# Patient Record
Sex: MALE | Race: WHITE | Employment: FULL TIME | URBAN - METROPOLITAN AREA
[De-identification: names, ages, dates, MRNs, and addresses within clinical notes are randomized per-mention and may not be internally consistent; named-entity substitution may affect disease eponyms.]

---

## 2019-12-07 ENCOUNTER — ED HISTORICAL/CONVERTED ENCOUNTER (OUTPATIENT)
Dept: OTHER | Age: 34
End: 2019-12-07

## 2020-01-25 ENCOUNTER — HOSPITAL ENCOUNTER (INPATIENT)
Age: 35
LOS: 5 days | Discharge: HOME | DRG: 750 | End: 2020-01-30
Attending: PSYCHIATRY & NEUROLOGY | Admitting: PSYCHIATRY & NEUROLOGY
Payer: COMMERCIAL

## 2020-01-25 PROBLEM — F25.9 SCHIZOAFFECTIVE DISORDER (HCC): Status: ACTIVE | Noted: 2020-01-25

## 2020-01-25 PROCEDURE — 65220000003 HC RM SEMIPRIVATE PSYCH

## 2020-01-25 PROCEDURE — 74011250637 HC RX REV CODE- 250/637: Performed by: NURSE PRACTITIONER

## 2020-01-25 RX ORDER — OLANZAPINE 5 MG/1
5 TABLET ORAL
Status: DISCONTINUED | OUTPATIENT
Start: 2020-01-25 | End: 2020-01-30 | Stop reason: HOSPADM

## 2020-01-25 RX ORDER — ACETAMINOPHEN 325 MG/1
650 TABLET ORAL
Status: DISCONTINUED | OUTPATIENT
Start: 2020-01-25 | End: 2020-01-30 | Stop reason: HOSPADM

## 2020-01-25 RX ORDER — IBUPROFEN 400 MG/1
400 TABLET ORAL
Status: DISCONTINUED | OUTPATIENT
Start: 2020-01-25 | End: 2020-01-30 | Stop reason: HOSPADM

## 2020-01-25 RX ORDER — HYDROXYZINE 50 MG/1
50 TABLET, FILM COATED ORAL
Status: DISCONTINUED | OUTPATIENT
Start: 2020-01-25 | End: 2020-01-30 | Stop reason: HOSPADM

## 2020-01-25 RX ORDER — IBUPROFEN 200 MG
1 TABLET ORAL DAILY
Status: DISCONTINUED | OUTPATIENT
Start: 2020-01-25 | End: 2020-01-30 | Stop reason: HOSPADM

## 2020-01-25 RX ORDER — CLONIDINE HYDROCHLORIDE 0.1 MG/1
0.1 TABLET ORAL
Status: DISCONTINUED | OUTPATIENT
Start: 2020-01-25 | End: 2020-01-30 | Stop reason: HOSPADM

## 2020-01-25 RX ORDER — CLONIDINE HYDROCHLORIDE 0.1 MG/1
0.1 TABLET ORAL
COMMUNITY
End: 2020-01-30

## 2020-01-25 RX ORDER — QUETIAPINE FUMARATE 100 MG/1
100 TABLET, FILM COATED ORAL
Status: DISCONTINUED | OUTPATIENT
Start: 2020-01-25 | End: 2020-01-29

## 2020-01-25 RX ORDER — QUETIAPINE FUMARATE 300 MG/1
300 TABLET, FILM COATED ORAL
Status: ON HOLD | COMMUNITY
End: 2020-01-30 | Stop reason: SDUPTHER

## 2020-01-25 RX ORDER — ADHESIVE BANDAGE
30 BANDAGE TOPICAL DAILY PRN
Status: DISCONTINUED | OUTPATIENT
Start: 2020-01-25 | End: 2020-01-30 | Stop reason: HOSPADM

## 2020-01-25 RX ORDER — HALOPERIDOL 5 MG/ML
5 INJECTION INTRAMUSCULAR
Status: DISCONTINUED | OUTPATIENT
Start: 2020-01-25 | End: 2020-01-30 | Stop reason: HOSPADM

## 2020-01-25 RX ORDER — TRAZODONE HYDROCHLORIDE 50 MG/1
50 TABLET ORAL
Status: DISCONTINUED | OUTPATIENT
Start: 2020-01-25 | End: 2020-01-30 | Stop reason: HOSPADM

## 2020-01-25 RX ORDER — BENZTROPINE MESYLATE 1 MG/1
1 TABLET ORAL
Status: DISCONTINUED | OUTPATIENT
Start: 2020-01-25 | End: 2020-01-30 | Stop reason: HOSPADM

## 2020-01-25 RX ORDER — DIPHENHYDRAMINE HYDROCHLORIDE 50 MG/ML
50 INJECTION, SOLUTION INTRAMUSCULAR; INTRAVENOUS
Status: DISCONTINUED | OUTPATIENT
Start: 2020-01-25 | End: 2020-01-30 | Stop reason: HOSPADM

## 2020-01-25 RX ORDER — LORAZEPAM 2 MG/ML
1 INJECTION INTRAMUSCULAR
Status: DISCONTINUED | OUTPATIENT
Start: 2020-01-25 | End: 2020-01-30 | Stop reason: HOSPADM

## 2020-01-25 RX ADMIN — HYDROXYZINE HYDROCHLORIDE 50 MG: 50 TABLET, FILM COATED ORAL at 18:19

## 2020-01-25 NOTE — BH NOTES
PRN Medication Documentation    Specific patient behavior that led to need for PRN medication: Pt complaining of anxiety and requesting medication. Staff interventions attempted prior to PRN being given: Therapeutic communication provided. Decreased environmental stimuli. Coping skills discussed. PRN medication given: 1 tablet 50 mg Atarax PO. Patient response/effectiveness of PRN medication: Pt eating dinner.

## 2020-01-25 NOTE — BH NOTES
TRANSFER - IN REPORT:    Verbal report received from  Ryan Roberts on Darwin Holland  being received from [facility] ED for routine progression of care      Report consisted of patients Situation, Background, Assessment and   Recommendations(SBAR). Information from the following report(s) SBAR and Kardex was reviewed with the receiving nurse. Opportunity for questions and clarification was provided. Assessment completed upon patients arrival to unit and care assumed. PT served TDO bharat.  to bring pt. Leaving ED within 10 minuttes.

## 2020-01-25 NOTE — CONSULTS
History & Physical    Primary Care Provider: Unknown, Provider  Source of Information: Patient     History of Presenting Illness:   Marlen Thorne is a 36 y.o. male who presents with depression  78-year-old white male history of depression and degenerative joint disease. Patient is transferred from [facility] to psych unit because of suicidal ideation. Patient said he is taking clonidine 0.1 mg every night for sleep and anxiety. He is aware of that this will drop the blood pressure so he said he is checking his blood pressure every time taking this medicine. Patient has a chronic bilateral knee pain. Denied any fever, no cough, no abdominal pain. Review of Systems:  General: no fever, no changes of appetite or sleep  HEENT: no headache, no vision changes, no nose discharge, no hearing changes   RES: no wheezing, no cough, no sob  CVS: no cp, no palpitation. Muscular: no joint swelling, hpi, no leg swelling  Skin: no rash, no itching   GI: no vomiting, no diarrhea  : no dysuria, no hematuria  Hemo: no gum bleeding, no petechial   Neuro: no sensation changes, no focal weakness   Endo: no polydipsia   Psych: denied depression   No past medical history on file. knee djd. Depression. No past surgical history on file. Prior to Admission medications    Not on File   seroquil. Clonidine, vistral   Allergies   Allergen Reactions    Bactrim [Sulfamethoprim] Unknown (comments)      No family history on file.      SOCIAL HISTORY:  Patient resides:  Independently x   Assisted Living    SNF    With family care       Smoking history:   None    Former    Chronic x     Alcohol history:   None    Social x   Chronic      Ambulates:   Independently x   w/cane    w/walker    w/wc    CODE STATUS:  DNR    Full x   Other      Objective:     Physical Exam:     Visit Vitals  /71 (BP 1 Location: Left arm, BP Patient Position: Sitting)   Pulse 86   Temp 97.6 °F (36.4 °C) Resp 16   SpO2 100%           General:  Alert, cooperative, no distress, appears stated age. Head:  Normocephalic, without obvious abnormality, atraumatic. Eyes:  Conjunctivae/corneas clear. PERRL, EOMs intact. Nose: Nares normal. Septum midline. Mucosa normal. No drainage or sinus tenderness. Throat: Lips, mucosa, and tongue normal. Teeth and gums normal.   Neck: Supple, symmetrical, trachea midline, no adenopathy, thyroid: no enlargement/tenderness/nodules, no carotid bruit and no JVD. Back:   Symmetric, no curvature. ROM normal. No CVA tenderness. Lungs:   Clear to auscultation bilaterally. Chest wall:  No tenderness or deformity. Heart:  Regular rate and rhythm, S1, S2 normal, no murmur, click, rub or gallop. Abdomen:   Soft, non-tender. Bowel sounds normal. No masses,  No organomegaly. Extremities: Extremities normal, atraumatic, no cyanosis or edema. bilat knee ROM normal    Pulses: 2+ and symmetric all extremities. Skin: Skin color, texture, turgor normal. No rashes or lesions   Neurologic: CNII-XII intact. No focal weakness            Data Review:     Recent Days:  No results for input(s): WBC, HGB, HCT, PLT, HGBEXT, HCTEXT, PLTEXT in the last 72 hours. No results for input(s): NA, K, CL, CO2, GLU, BUN, CREA, CA, MG, PHOS, ALB, TBIL, SGOT, ALT, INR, INREXT in the last 72 hours. No results for input(s): PH, PCO2, PO2, HCO3, FIO2 in the last 72 hours. Labs are from [facility] reviewed potassium 3.4, sodium 143, BUN 11, creatinine 0.9, hemoglobin 12.3, WBC 10.7  24 Hour Results:  No results found for this or any previous visit (from the past 24 hour(s)). Imaging:     Assessment:     Active Problems:    * No active hospital problems. *         Plan:     1. bilat knee pain: chronic, mild, may use tylenol as needed  2. Smoking: nicotine patch  3. Depression/SI per psych: pt was on clonidine.  Would like to resume to avoid rebound        Signed By: Ministerio Estrada MD     January 25, 2020
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
## 2020-01-26 PROCEDURE — 74011250637 HC RX REV CODE- 250/637: Performed by: NURSE PRACTITIONER

## 2020-01-26 PROCEDURE — 74011250637 HC RX REV CODE- 250/637: Performed by: HOSPITALIST

## 2020-01-26 PROCEDURE — 74011250637 HC RX REV CODE- 250/637: Performed by: PSYCHIATRY & NEUROLOGY

## 2020-01-26 PROCEDURE — 65220000003 HC RM SEMIPRIVATE PSYCH

## 2020-01-26 RX ORDER — QUETIAPINE FUMARATE 100 MG/1
300 TABLET, FILM COATED ORAL
Status: DISCONTINUED | OUTPATIENT
Start: 2020-01-26 | End: 2020-01-30 | Stop reason: HOSPADM

## 2020-01-26 RX ORDER — QUETIAPINE FUMARATE 100 MG/1
100 TABLET, FILM COATED ORAL DAILY
Status: DISCONTINUED | OUTPATIENT
Start: 2020-01-27 | End: 2020-01-30 | Stop reason: HOSPADM

## 2020-01-26 RX ADMIN — MAGNESIUM HYDROXIDE 30 ML: 400 SUSPENSION ORAL at 21:12

## 2020-01-26 RX ADMIN — CLONIDINE HYDROCHLORIDE 0.1 MG: 0.1 TABLET ORAL at 21:11

## 2020-01-26 RX ADMIN — QUETIAPINE FUMARATE 300 MG: 100 TABLET ORAL at 21:12

## 2020-01-26 NOTE — PROGRESS NOTES
Problem: Altered Thought Process (Adult/Pediatric)  Goal: *STG: Participates in treatment plan  Outcome: Progressing Towards Goal   pt denies SI. Self harming behavior not observed. Problem: Anxiety-Behavioral Health (Adult/Pediatric)  Goal: *STG: Attends activities and groups  Outcome: Progressing Towards Goal     Problem: Anxiety-Behavioral Health (Adult/Pediatric)  Goal: *STG: Attends activities and groups  Outcome: Progressing Towards Goal     Problem: Anxiety-Behavioral Health (Adult/Pediatric)  Goal: Interventions  Outcome: Progressing Towards Goal  Note:   Pt is alert and oriented. Visible on the unit. Blunted affect. Plan to meet with initial treatment team today. Discuss PTA medications, event, and treatment plan. Pt has TDO hearing scheduled of 1/27/20 at 0830 with Michael.     Good appetite. Pt focus is on scheduled Seroquel HS dosage.     [address] [facility] 60  Master Treatment Plan for Jon Coleman    Date Treatment Plan Initiated: 1/27/20    Treatment Plan Modalities:  Type of Modality Amount  (x minutes) Frequency (x/week) Duration (x days) Name of Responsible Staff   [address] [facility] meetings to encourage peer interactions 15 7 1 Cortney COLBERT     Group psychotherapy to assist in building coping skills and internal controls 60 7 1 Jose El   Therapeutic activity groups to build coping skills 60 7 1 Jose El   Psychoeducation in group setting to address:   Medication education   15 7 [address]   Coping skills         Relaxation techniques         Symptom management         Discharge planning   60 2 Perlilliam Lee 115   60 1 1 volunteer   Recovery/AA/NA      volunteer   Physician medication management   15 7 1 Dr Mallory/ Dr Lemus Elias   15 2 1400 [facility]                                Blocked Bed Documentation:    Room number: 732  Type: Behavior  Rationale: Poor Sleep Pattern  Anticipated duration: re evaluate q shift  Additional comments:isomnia, PTSD with impulsive aggressive behaviors increased at night , not on scheduled medications     Pt pharmacy out patient open Mon-Fri. Unable to verify pta medications.

## 2020-01-26 NOTE — BH NOTES
Blocked Bed Documentation:    Room number: 696  Type: Behavior  Rationale: Poor Self-Control  Anticipated duration: reassess every shift  Additional comments: pt presents with paranoia, SI/HI/AVH, insomnia and impulsive
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
## 2020-01-26 NOTE — INTERDISCIPLINARY ROUNDS
Behavioral Health Interdisciplinary Rounds     Patient Name: Anup Barber  Age: 36 y.o. Room/Bed:  732/  Primary Diagnosis: <principal problem not specified>   Admission Status: TDO     Readmission within 30 days: no  Power of  in place: no  Patient requires a blocked bed:yes          Reason for blocked bed: behavior    VTE Prophylaxis: Not indicated    Mobility needs/Fall risk: no  Flu Vaccine : no   Nutritional Plan: no  Consults:          Labs/Testing due today?: no    Sleep hours:  7.5      Participation in Care/Groups:  yes  Medication Compliant?: Yes  PRNS (last 24 hours): None    Restraints (last 24 hours):  no     CIWA (range last 24 hours):     COWS (range last 24 hours):      Alcohol screening (AUDIT) completed -   AUDIT Score: 2     If applicable, date SBIRT discussed in treatment team AND documented:   AUDIT Screen Score: AUDIT Score: 2      Document Brief Intervention (corresponds directly with the 5 A's, Ask, Advise, Assess, Assist, and Arrange): At- Risk Patients (Score 7-15 for women; 8-15 for men)  Discuss concern patient is drinking at unhealthy levels known to increase risk of alcohol-related health problems. Is Patient ready to commit to change? If No:   Encourage reflection   Discuss short term and long term health risks of consuming alcohol   Barriers to change   Reaffirm willingness to help / Educational materials provided  If Yes:   Set goal  [employer] provided    Harmful use or Dependence (Score 16 or greater)   Discuss short term and long term health risks of consuming alcohol   Recommendations   Negotiate drinking goal   Recommend addiction specialist/center   Arrange follow-up appointments.     Tobacco - patient is a smoker: Have You Used Tobacco in the Past 30 Days: Yes  Illegal Drugs use: Have You Used Any Illegal Substances Over the Past 12 Months: Yes    24 hour chart check complete: yes     Patient goal(s) for today:   Treatment team focus/goals:   Progress note     LOS:  0  Expected LOS:     Financial concerns/prescription coverage:    Family contact:       Family requesting physician contact today:    Discharge plan:   Access to weapons : no        Outpatient provider(s):   Patient's preferred phone number for follow up call :     Participating treatment team members: Garrick Pacheco, * (assigned SW),
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
## 2020-01-26 NOTE — PROGRESS NOTES
Pt receiving continuous q15m safety checks, pt currently asleep resting comfortably in bed. No distress noted, respiratory WNL. Supplemental lighting utilized. Problem: Falls - Risk of  Goal: *Absence of Falls  Description  Document Erickson Bray Fall Risk and appropriate interventions in the flowsheet.   Outcome: Progressing Towards Goal  Note: Fall Risk Interventions:       Medication Interventions: Teach patient to arise slowly       Problem: Altered Thought Process (Adult/Pediatric)  Goal: *STG: Remains safe in hospital  Outcome: Progressing Towards Goal

## 2020-01-26 NOTE — GROUP NOTE
[facility] GROUP DOCUMENTATION INDIVIDUAL                                                                          Group Therapy Note    Date: 1/25/2020    Group Start Time: 2030  Group End Time: 2040  Group Topic: Reflection/Relaxation    [address] [facility] 7W ACUTE BEHA HL    [place], [facility]    IP [address] [facility] GROUP DOCUMENTATION GROUP    Group Therapy Note    Attendees:          Attendance: Attended    Patient's Goal:  Unit orientation and daily progress    Interventions/techniques: Supported    Follows Directions: Followed directions    Interactions: Interacted appropriately    Mental Status: Calm and Flat    Behavior/appearance: Cooperative    Goals Achieved: Able to listen to others      Additional Notes:  Quiet in group. Voiced no concerns. Seems to be adjusting to unit okay.     [facility] [id number]

## 2020-01-26 NOTE — GROUP NOTE
IP  GROUP DOCUMENTATION INDIVIDUAL                                                                          Group Therapy Note    Date: 1/26/2020    Group Start Time: 9009  Group End Time: 0439  Group Topic: Community Meeting    [facility] PSYCHIATRIC [place] [facility] [address] [facility]    Chemo [place]    IP  GROUP DOCUMENTATION GROUP    Group Therapy Note    Attendees: 4/10         Attendance: Attended    Patient's Goal:  Get a little better    Interventions/techniques: Informed and Supported    Follows Directions: Followed directions    Interactions: Interacted appropriately    Mental Status: Calm    Behavior/appearance: Cooperative and Enthusiastic    Goals Achieved: Able to engage in interactions, Able to listen to others, Able to give feedback to another and Able to reflect/comment on own behavior      Additional Notes:  Patient talkative with peers displaying a positive attitude and affect.      Rolando Radford

## 2020-01-27 PROCEDURE — 74011250637 HC RX REV CODE- 250/637: Performed by: PSYCHIATRY & NEUROLOGY

## 2020-01-27 PROCEDURE — 65220000003 HC RM SEMIPRIVATE PSYCH

## 2020-01-27 RX ORDER — BUPROPION HYDROCHLORIDE 150 MG/1
150 TABLET ORAL
COMMUNITY

## 2020-01-27 RX ADMIN — QUETIAPINE FUMARATE 300 MG: 100 TABLET ORAL at 20:53

## 2020-01-27 RX ADMIN — QUETIAPINE FUMARATE 100 MG: 100 TABLET ORAL at 08:43

## 2020-01-27 NOTE — INTERDISCIPLINARY ROUNDS
Behavioral Health Interdisciplinary Rounds     Patient Name: Jojo Purdy  Age: 36 y.o. Room/Bed:  732/  Primary Diagnosis: <principal problem not specified>   Admission Status: Voluntary Commitment     Readmission within 30 days: no  Power of  in place: no  Patient requires a blocked bed: yes          Reason for blocked bed:     VTE Prophylaxis: No    Mobility needs/Fall risk: no  Flu Vaccine : no   Nutritional Plan: no  Consults:          Labs/Testing due today?: no    Sleep hours:  8        Participation in Care/Groups:  yes  Medication Compliant?: Yes  PRNS (last 24 hours): None    Restraints (last 24 hours):  no     CIWA (range last 24 hours):     COWS (range last 24 hours):      Alcohol screening (AUDIT) completed -   AUDIT Score: 2     If applicable, date SBIRT discussed in treatment team AND documented:   AUDIT Screen Score: AUDIT Score: 2    Tobacco - patient is a smoker: Have You Used Tobacco in the Past 30 Days: Yes  Illegal Drugs use: Have You Used Any Illegal Substances Over the Past 12 Months: Yes    24 hour chart check complete: yes     Patient goal(s) for today:   Treatment team focus/goals: Plan to assess for medications and discharge needs.    Progress note : he was pleasant and complaint with treatment team.  Plan to safely detox, voluntarily committed to [facility] today      LOS:  2  Expected LOS: TBD    Financial concerns/prescription coverage:  Medicaid   Family contact:       Family requesting physician contact today:    Discharge plan: he will rent to his room for rent   Access to weapons :  no       Outpatient provider(s): [address] [place] [address] for Addiction  Medicine   Patient's preferred phone number for follow up call :     Participating treatment team members: Jojo Purdy,  Iván VAILA 19Nick Avclaire ISABEL

## 2020-01-27 NOTE — BH NOTES
GROUP THERAPY PROGRESS NOTE    Troy Abernathy is participating in [place]. Group time: 30 minutes    Personal goal for participation: to get discharge and go home. Goal orientation: personal    Group therapy participation: active    Therapeutic interventions reviewed and discussed: Writer listened attentively and explained the unit routine. Impression of participation: Patient participated actively.

## 2020-01-27 NOTE — BH NOTES
GROUP THERAPY PROGRESS NOTE    Chantell Cuba did not participate in an Acute Unit Process Group, with a focus on identifying feelings, planning for the rest of the day, and developing coping skills.

## 2020-01-27 NOTE — PROGRESS NOTES
Problem: Patient Education: Go to Patient Education Activity  Goal: Patient/Family Education  Outcome: Resolved/Not Met  Note:   Denies SI/HI CONTRACTS FOR SAFETY ON UNIT.VERBALLY AGREES TO SEEK STAFF IF THOUGHTS OCCUR     Problem: Altered Thought Process (Adult/Pediatric)  Goal: *STG: Participates in treatment plan  Outcome: Progressing Towards Goal     Problem: Altered Thought Process (Adult/Pediatric)  Goal: *STG: Remains safe in hospital  Outcome: Not Progressing Towards Goal  Note:   Self admits some level of paranoia to some of the other patients. Problem: Altered Thought Process (Adult/Pediatric)  Goal: *STG: Complies with medication therapy  Outcome: Progressing Towards Goal  Note:   Has been medication compliant. Verbalized basic understanding of Seroquel. Problem: Anxiety-Behavioral Health (Adult/Pediatric)  Goal: *STG: Attends activities and groups  Outcome: Progressing Towards Goal     Problem: Depressed Mood (Adult/Pediatric)  Goal: *STG: Verbalizes anger, guilt, and other feelings in a constructive manor  Outcome: Progressing Towards Goal  Variance Patient Condition  Impact: Moderate  Note:   Appears to have an increases in patience and less irritable edge at this time.
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
## 2020-01-27 NOTE — PROGRESS NOTES
NUTRITION  Reason for Rescreen: [facility]        RECOMMENDATIONS:   1. Add daily MVI  2. Weekly weights on standing scale    Interventions   - added supplements/snacks: Ensure TID, double portions, snack       Information obtained from:   patient      No past medical history on file. Pt admitted for psychosis. Substance abuse and paranoia PTA. Pt visited today. He reports about 45# wt loss over past 2 months. -180#. Appetite down prior to admit. Eating well since here with extra portions requested. HS snack and Ensure TID added (1050kcal, 60g protein). Predict that since appetite already improved that pt will be meeting nutrition needs. Will sign off at this time. If wt loss this admit, or appetite poor please re-consult. Diet:  regular  Supplements: None  Intake: Good  No data found. Weight Changes:   Loss - per PTA  Wt Readings from Last 10 Encounters:   01/26/20 62.4 kg (137 lb 9.6 oz)     Nutrition-Related Concerns Identified:  None     Nutrition Diagnosis:   1.  Unintended weight loss related to inadequate oral intake 2/2 psychosis, substance abuse as evidenced by severe wt loss PTA; poor PO PTA; >100% meals consumed    Estimated Nutrition Needs:   Kcals/day: 1860 Kcals/day(1860-2170kcal)  Protein: 74 g(1.2g/kg)  Fluid: 2000 ml(1ml/kcal)  Based On: Kcal/kg - specify (Comment)(30-35kcal/kg)  Weight Used: Actual wt(62kg)    PLAN:   - Continue current diet    Rescreen:  []            At Nutrition Risk - will follow          [x]            Signed off    Tabatha Boogie, RD [address] [place] , Pager #[phone] or via [employer]

## 2020-01-27 NOTE — GROUP NOTE
[facility] GROUP DOCUMENTATION INDIVIDUAL Group Therapy Note Date: 1/26/2020 Group Start Time: 2030 Group End Time: 2100 Group Topic: Reflection/Relaxation [address] Shey Estrada [facility] GROUP DOCUMENTATION GROUP Group Therapy Note Attendees: 5/8 Positive Lens/ Reflection Attendance: Attended Patient's Goal: Shared during the group Interventions/techniques: Validated Follows Directions: Followed directions Interactions: Interacted appropriately Mental Status: Calm Behavior/appearance: Motivated Goals Achieved: Able to engage in interactions Additional Notes:  Patient was calm and quiet during the group. Patient volunteered to share his responses during the positive lens exercise. Patient was able to identify ways to cope in a positive manner. Karyn Mcmullen

## 2020-01-27 NOTE — PROGRESS NOTES
2315: Patient in bed resting with eyes closed. Respirations are even and unlabored. No apparent distress. Staff will continue to monitor q15 throughout the shift. Problem: Falls - Risk of  Goal: *Absence of Falls  Description  Document Colby Rogers Fall Risk and appropriate interventions in the flowsheet.   Outcome: Progressing Towards Goal  Note: Fall Risk Interventions:            Medication Interventions: Teach patient to arise slowly                 Blocked Bed Documentation:    Room number: 732  Type: Behavior  Rationale: Poor Self-Control  Anticipated duration: TBD  Additional comments:

## 2020-01-27 NOTE — PROGRESS NOTES
Problem: Altered Thought Process (Adult/Pediatric)  Goal: *STG: Participates in treatment plan  Outcome: Progressing Towards Goal  Goal: *STG: Remains safe in hospital  Outcome: Progressing Towards Goal  Goal: *STG: Seeks staff when feelings of anxiety and fear arise  Outcome: Progressing Towards Goal  Goal: *STG: Complies with medication therapy  Outcome: Progressing Towards Goal  Goal: *LTG: Returns to baseline functioning  Outcome: Progressing Towards Goal  Pt verbalizes reality based thinking. No evidence of psychoses. No acting out behaviors demonstrated.
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
## 2020-01-28 LAB
CHOLEST SERPL-MCNC: 141 MG/DL
EST. AVERAGE GLUCOSE BLD GHB EST-MCNC: 111 MG/DL
HBA1C MFR BLD: 5.5 % (ref 4–5.6)
HDLC SERPL-MCNC: 43 MG/DL
HDLC SERPL: 3.3 {RATIO} (ref 0–5)
LDLC SERPL CALC-MCNC: 58.2 MG/DL (ref 0–100)
LIPID PROFILE,FLP: ABNORMAL
TRIGL SERPL-MCNC: 199 MG/DL (ref ?–150)
VLDLC SERPL CALC-MCNC: 39.8 MG/DL

## 2020-01-28 PROCEDURE — 80061 LIPID PANEL: CPT

## 2020-01-28 PROCEDURE — 83036 HEMOGLOBIN GLYCOSYLATED A1C: CPT

## 2020-01-28 PROCEDURE — 74011250637 HC RX REV CODE- 250/637: Performed by: PSYCHIATRY & NEUROLOGY

## 2020-01-28 PROCEDURE — 65220000003 HC RM SEMIPRIVATE PSYCH

## 2020-01-28 PROCEDURE — 74011250637 HC RX REV CODE- 250/637: Performed by: HOSPITALIST

## 2020-01-28 PROCEDURE — 36415 COLL VENOUS BLD VENIPUNCTURE: CPT

## 2020-01-28 RX ADMIN — CLONIDINE HYDROCHLORIDE 0.1 MG: 0.1 TABLET ORAL at 20:36

## 2020-01-28 RX ADMIN — QUETIAPINE FUMARATE 300 MG: 100 TABLET ORAL at 20:35

## 2020-01-28 RX ADMIN — QUETIAPINE FUMARATE 100 MG: 100 TABLET ORAL at 08:25

## 2020-01-28 NOTE — PROGRESS NOTES
Laboratory Monitoring for Antipsychotics: This patient is currently prescribed the following medication(s):   Current Facility-Administered Medications   Medication Dose Route Frequency    QUEtiapine (SEROquel) tablet 100 mg  100 mg Oral DAILY    QUEtiapine (SEROquel) tablet 300 mg  300 mg Oral QHS    nicotine (NICODERM CQ) 21 mg/24 hr patch 1 Patch  1 Patch TransDERmal DAILY    cloNIDine HCL (CATAPRES) tablet 0.1 mg  0.1 mg Oral QHS     The following labs have been completed for monitoring of antipsychotics and/or mood stabilizers:    Height, Weight, BMI Estimation  Estimated body mass index is 19.74 kg/m² as calculated from the following:    Height as of this encounter: 177.8 cm (70\"). Weight as of this encounter: 62.4 kg (137 lb 9.6 oz). Vital Signs/Blood Pressure  Visit Vitals  /88 (BP 1 Location: Left arm, BP Patient Position: Sitting)   Pulse 100   Temp 98.6 °F (37 °C)   Resp 20   Ht 177.8 cm (70\")   Wt 62.4 kg (137 lb 9.6 oz)   SpO2 100%   BMI 19.74 kg/m²     Renal Function, Hepatic Function and Chemistry  CrCl cannot be calculated (No successful lab value found. ). Lab Results   Component Value Date/Time    Bilirubin, total 0.3 07/17/2010 11:00 PM    Protein, total 8.3 (H) 07/17/2010 11:00 PM    Albumin 4.4 07/17/2010 11:00 PM    Globulin 3.9 07/17/2010 11:00 PM    A-G Ratio 1.1 07/17/2010 11:00 PM    ALT (SGPT) 29 07/17/2010 11:00 PM    Alk.  phosphatase 66 07/17/2010 11:00 PM     No results found for: GLU, GLUCPOC  Lab Results   Component Value Date/Time    Hemoglobin A1c 5.5 01/28/2020 05:55 AM     Hematology  Lab Results   Component Value Date/Time    WBC 9.2 07/17/2010 11:00 PM    RBC 4.74 07/17/2010 11:00 PM    HGB 15.1 07/17/2010 11:00 PM    HCT 42.9 07/17/2010 11:00 PM    MCV 90.5 (H) 07/17/2010 11:00 PM    MCH 31.9 07/17/2010 11:00 PM    MCHC 35.2 07/17/2010 11:00 PM    RDW 12.9 07/17/2010 11:00 PM    PLATELET 703 (L) 70/83/3135 11:00 PM     Lipids  Lab Results   Component Value Date/Time    Cholesterol, total 141 01/28/2020 05:54 AM    HDL Cholesterol 43 01/28/2020 05:54 AM    LDL, calculated 58.2 01/28/2020 05:54 AM    Triglyceride 199 (H) 01/28/2020 05:54 AM    CHOL/HDL Ratio 3.3 01/28/2020 05:54 AM     Thyroid Function  No results found for: TSH, TSH2, TSH3, TSHP, TSHELE, TT3, T3U, T3UP, FRT3, FT3, FT4, FT4P, T4, T4P, FT4T, TT7, TSHEXT    Assessment/Plan:  Recommended baseline laboratory monitoring has been completed based on this patient's current medication regimen. No further interventions are needed at this time. Follow-up metabolic monitoring labs should be completed in 3 months (quarterly for the first year of antipsychotic therapy).      Mary Wheeler, PHARMD

## 2020-01-28 NOTE — PROGRESS NOTES
Problem: Discharge Planning  Goal: *Discharge to safe environment  Outcome: Progressing Towards Goal  Note:   He will return to his room for rent   He has established outpatient treatment   Goal: *Knowledge of medication management  Outcome: Progressing Towards Goal  Note:   He has been compliant with his medications   Goal: *Knowledge of discharge instructions  Outcome: Progressing Towards Goal  Note:   He is able to verbalize discharge instructions

## 2020-01-28 NOTE — BH NOTES
1621 Pt is on 1:1 per Suicide Precautions guidelines.   1721  Suicide Precautions/1:1 discontinued per Provider order.

## 2020-01-28 NOTE — GROUP NOTE
DEVEN  GROUP DOCUMENTATION INDIVIDUAL                                                                          Group Therapy Note    Date: 1/28/2020    Group Start Time: 1000  Group End Time: 1030  Group Topic: [place] Meeting    [address]    [address] [facility] GROUP DOCUMENTATION GROUP    Group Therapy Note    Attendees:          Attendance: Attended    Patient's Goal:      Interventions/techniques: Informed    Follows Directions:  Followed directions    Interactions: Interacted appropriately    Mental Status: Calm    Behavior/appearance: Attentive    Goals Achieved: Able to engage in interactions      Additional Notes:      Brian Mathur

## 2020-01-28 NOTE — INTERDISCIPLINARY ROUNDS
Behavioral Health Interdisciplinary Rounds     Patient Name: Hoda Linton  Age: 36 y.o. Room/Bed:  732/  Primary Diagnosis: <principal problem not specified>   Admission Status: Voluntary Commitment     Readmission within 30 days: no  Power of  in place: no  Patient requires a blocked bed: yes          Reason for blocked bed: behavior    VTE Prophylaxis: No    Mobility needs/Fall risk: no  Flu Vaccine : no   Nutritional Plan: no  Consults:          Labs/Testing due today?: yes    Sleep hours:  9      Participation in Care/Groups:  no  Medication Compliant?: Yes  PRNS (last 24 hours): None    Restraints (last 24 hours):  no     CIWA (range last 24 hours):     COWS (range last 24 hours):      Alcohol screening (AUDIT) completed -   AUDIT Score: 2     If applicable, date SBIRT discussed in treatment team AND documented:   AUDIT Screen Score: AUDIT Score: 2      Tobacco - patient is a smoker: Have You Used Tobacco in the Past 30 Days: Yes  Illegal Drugs use: Have You Used Any Illegal Substances Over the Past 12 Months: Yes    24 hour chart check complete: yes     Patient goal(s) for today:   Treatment team focus/goals: Plan to continue to monitor his medications and set up for discharge on Thursday. Progress note : less paranoid and denies hearing voices. LOS:  2  Expected LOS: TBD    Financial concerns/prescription coverage:  no  Family contact:       Family requesting physician contact today:  no  Discharge plan: he will return to his room for rent. Access to weapons : no        Outpatient provider(s): [facility]  Patient's preferred phone number for follow up call :     Participating treatment team members: Hoda Linton,  Laura Reeves, Juan Antonio Castellano NP - Marilyn Ruano RN - Hannah Moseley, PharmD.

## 2020-01-28 NOTE — PROGRESS NOTES
Problem: Anxiety-Behavioral Health (Adult/Pediatric)  Goal: *STG: Remains safe in hospital  Outcome: Progressing Towards Goal  Note:   Out in milieu for short period of time during dinner time. Minimal interaction with peers. No acute distress noted. Meal and medication compliant. Staff will continue to monitor q 15 min checks.
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
## 2020-01-28 NOTE — PROGRESS NOTES
Pt receiving continuous q15m safety checks, pt currently asleep resting comfortably in bed. No distress noted, respiratory WNL. Supplemental lighting utilized. Blocked Bed Documentation:    Room number: 085  Type: Behavior  Rationale: Poor Self-Control  Anticipated duration: reassess every shift  Additional comments:    Problem: Falls - Risk of  Goal: *Absence of Falls  Description  Document Crystal Norman Fall Risk and appropriate interventions in the flowsheet. Outcome: Progressing Towards Goal  Note: Fall Risk Interventions:       Mentation Interventions: Adequate sleep, hydration, pain control    Medication Interventions: Teach patient to arise slowly    Elimination Interventions:  Toilet paper/wipes in reach    History of Falls Interventions: Room close to nurse's station         Problem: Altered Thought Process (Adult/Pediatric)  Goal: *STG: Remains safe in hospital  Outcome: Progressing Towards Goal     Problem: Anxiety-Behavioral Health (Adult/Pediatric)  Goal: *STG: Remains safe in hospital  Outcome: Progressing Towards Goal

## 2020-01-28 NOTE — DISCHARGE INSTRUCTIONS
DISCHARGE SUMMARY    Deisy Tate  : 1985  MRN: [id number]    The patient Elysia Lynn exhibits the ability to control behavior in a less restrictive environment. Patient's level of functioning is improving. No assaultive/destructive behavior has been observed for the past 24 hours. No suicidal/homicidal threat or behavior has been observed for the past 24 hours. There is no evidence of serious medication side effects. Patient has not been in physical or protective restraints for at least the past 24 hours. If weapons involved, how are they secured? No weapons involved     Is patient aware of and in agreement with discharge plan? He is aware of discharge and is in agreement     Arrangements for medication:  Prescriptions given to patient. Copy of discharge instructions to provider?:  Yes, fax to [address] for transportation home: patient to arrange     Keep all follow up appointments as scheduled, continue to take prescribed medications per physician instructions. Mental health crisis number:  [phone] or your local mental health crisis line number at [facility] [phone] from Nurse    PATIENT INSTRUCTIONS:    What to do at Home:  Recommended activity: Activity as tolerated,     If you experience any of the following symptoms hoplessness,thoughts of hurting yourself or othesrs please follow up with 911 or [phone] your local mental health crisis number . *  Please give a list of your current medications to your Primary Care Provider. *  Please update this list whenever your medications are discontinued, doses are      changed, or new medications (including over-the-counter products) are added. *  Please carry medication information at all times in case of emergency situations.     These are general instructions for a healthy lifestyle:    No smoking/ No tobacco products/ Avoid exposure to second hand smoke  Surgeon General's Warning:  Quitting smoking now greatly reduces serious risk to your health. Obesity, smoking, and sedentary lifestyle greatly increases your risk for illness    A healthy diet, regular physical exercise & weight monitoring are important for maintaining a healthy lifestyle    You may be retaining fluid if you have a history of heart failure or if you experience any of the following symptoms:  Weight gain of 3 pounds or more overnight or 5 pounds in a week, increased swelling in our hands or feet or shortness of breath while lying flat in bed. Please call your doctor as soon as you notice any of these symptoms; do not wait until your next office visit. The discharge information has been reviewed with the patient. The patient verbalized understanding. Discharge medications reviewed with the patient and appropriate educational materials and side effects teaching were provided.   ___________________________________________________________________________________________________________________________________

## 2020-01-29 PROCEDURE — 74011250637 HC RX REV CODE- 250/637: Performed by: NURSE PRACTITIONER

## 2020-01-29 PROCEDURE — 65220000003 HC RM SEMIPRIVATE PSYCH

## 2020-01-29 PROCEDURE — 74011250637 HC RX REV CODE- 250/637: Performed by: PSYCHIATRY & NEUROLOGY

## 2020-01-29 RX ADMIN — HYDROXYZINE HYDROCHLORIDE 50 MG: 50 TABLET, FILM COATED ORAL at 01:32

## 2020-01-29 RX ADMIN — QUETIAPINE FUMARATE 300 MG: 100 TABLET ORAL at 21:12

## 2020-01-29 RX ADMIN — QUETIAPINE FUMARATE 100 MG: 100 TABLET ORAL at 08:13

## 2020-01-29 RX ADMIN — HYDROXYZINE HYDROCHLORIDE 50 MG: 50 TABLET, FILM COATED ORAL at 20:11

## 2020-01-29 NOTE — BH NOTES
PRN Medication Documentation    Specific patient behavior that led to need for PRN medication: C/o of nausea , Restless   Staff interventions attempted prior to PRN being given: saltines , room temp ginger ale   PRN medication given: Atarax 50 mg po   Patient response/effectiveness of PRN medication: pending

## 2020-01-29 NOTE — PROGRESS NOTES
Pt received resting quietly in bed with eyes closed, appears asleep. Respirations even and unlabored, NAD noted. Staff to continue q15 minute checks for safety. Problem: Falls - Risk of  Goal: *Absence of Falls  Description  Document July Getting Fall Risk and appropriate interventions in the flowsheet. Outcome: Progressing Towards Goal  Note: Fall Risk Interventions:  Mobility Interventions: Assess mobility with egress test    Mentation Interventions: Adequate sleep, hydration, pain control    Medication Interventions: Teach patient to arise slowly    Elimination Interventions:  Toilet paper/wipes in reach    History of Falls Interventions: Door open when patient unattended         Problem: Altered Thought Process (Adult/Pediatric)  Goal: *STG: Remains safe in hospital  Outcome: Progressing Towards Goal

## 2020-01-29 NOTE — PROGRESS NOTES
Problem: Patient Education: Go to Patient Education Activity  Goal: Patient/Family Education  Outcome: Progressing Towards Goal     Problem: Falls - Risk of  Goal: *Absence of Falls  Description  Document Erickson Asa Fall Risk and appropriate interventions in the flowsheet. Outcome: Progressing Towards Goal  Note: Fall Risk Interventions:  Mobility Interventions: Assess mobility with egress test    Mentation Interventions: Adequate sleep, hydration, pain control    Medication Interventions: Teach patient to arise slowly    Elimination Interventions: Toilet paper/wipes in reach    History of Falls Interventions: Door open when patient unattended         Problem: Patient Education: Go to Patient Education Activity  Goal: Patient/Family Education  Outcome: Progressing Towards Goal     Problem: Altered Thought Process (Adult/Pediatric)  Goal: *STG: Participates in treatment plan  Outcome: Progressing Towards Goal  Note:   Pt is cooperative on unit, social with others. Denies SI/HI. Displaying minor anxiety.       Problem: Altered Thought Process (Adult/Pediatric)  Goal: *STG: Complies with medication therapy  Note:   Medication compliant

## 2020-01-29 NOTE — BH NOTES
GROUP THERAPY PROGRESS NOTE    Leopold Brim did not participate in an Acute Unit Process Group, with a focus on identifying feelings, planning for the rest of the day, and developing coping skills.
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
## 2020-01-29 NOTE — BH NOTES
States bedtime Seroquel makes him nauseated , asked for saltines and ginger ale   Then asked for peanut butter stating it makes him hungry

## 2020-01-29 NOTE — GROUP NOTE
DEVEN  GROUP DOCUMENTATION INDIVIDUAL Group Therapy Note Date: 1/29/2020 Group Start Time: 1000 Group End Time: 3064 Group Topic: ComRaj Vega [facility] Group Therapy Note Attendees:  
 
  
 
Attendance: Attended Patient's Goal: Interventions/techniques: Informed Follows Directions: Followed directions Interactions: Interacted appropriately Mental Status: Calm Behavior/appearance: Attentive and Cooperative Goals Achieved: Able to engage in interactions Additional Notes:   
 
Charleen Guidry

## 2020-01-30 VITALS
WEIGHT: 137.6 LBS | DIASTOLIC BLOOD PRESSURE: 74 MMHG | SYSTOLIC BLOOD PRESSURE: 104 MMHG | TEMPERATURE: 98 F | HEIGHT: 70 IN | RESPIRATION RATE: 16 BRPM | OXYGEN SATURATION: 100 % | HEART RATE: 96 BPM | BODY MASS INDEX: 19.7 KG/M2

## 2020-01-30 PROCEDURE — 74011250637 HC RX REV CODE- 250/637: Performed by: PSYCHIATRY & NEUROLOGY

## 2020-01-30 PROCEDURE — 74011250637 HC RX REV CODE- 250/637: Performed by: NURSE PRACTITIONER

## 2020-01-30 RX ORDER — QUETIAPINE FUMARATE 300 MG/1
300 TABLET, FILM COATED ORAL
Qty: 30 TAB | Refills: 0 | Status: SHIPPED | OUTPATIENT
Start: 2020-01-30

## 2020-01-30 RX ORDER — QUETIAPINE FUMARATE 100 MG/1
100 TABLET, FILM COATED ORAL DAILY
Qty: 30 TAB | Refills: 0 | Status: SHIPPED | OUTPATIENT
Start: 2020-01-30

## 2020-01-30 RX ADMIN — QUETIAPINE FUMARATE 100 MG: 100 TABLET ORAL at 07:51

## 2020-01-30 NOTE — BH NOTES
GROUP THERAPY PROGRESS NOTE    Carla Joel did not participate in an Acute Unit Process Group, with a focus on identifying feelings, planning for the rest of the day, and developing coping skills. He may have been finalizing his discharge plans with members of his treatment team, especially nursing.

## 2020-01-30 NOTE — PROGRESS NOTES
Problem: Altered Thought Process (Adult/Pediatric)  Goal: *STG: Remains safe in hospital  Outcome: Progressing Towards Goal  Goal: *STG: Complies with medication therapy  Outcome: Progressing Towards Goal     Problem: Altered Thought Process (Adult/Pediatric)  Goal: *STG: Seeks staff when feelings of anxiety and fear arise  Outcome: Not Progressing Towards Goal  Pt is alert and oriented. Stayed in the room for most of the evening. Cooperative and responds appropriately on approach. Medication compliant. Will continue to monitor Q 15 minutes for the safety.

## 2020-01-30 NOTE — PROGRESS NOTES
Pharmacist Discharge Medication Reconciliation    Discharging Provider: Salvador Campa NP    Significant PMH: History reviewed. No pertinent past medical history. Chief Complaint for this Admission: No chief complaint on file. Allergies: Bactrim [sulfamethoprim]    Discharge Medications:   Current Discharge Medication List        CONTINUE these medications which have CHANGED    Details   !! QUEtiapine (SEROQUEL) 100 mg tablet Take 1 Tab by mouth daily. Indications: bipolar depression  Qty: 30 Tab, Refills: 0      !! QUEtiapine (SEROQUEL) 300 mg tablet Take 1 Tab by mouth nightly. Indications: bipolar depression  Qty: 30 Tab, Refills: 0       !! - Potential duplicate medications found. Please discuss with provider. CONTINUE these medications which have NOT CHANGED    Details   buPROPion XL (WELLBUTRIN XL) 150 mg tablet Take 150 mg by mouth every morning.            STOP taking these medications       cloNIDine HCL (CATAPRES) 0.1 mg tablet Comments:   Reason for Stopping:             The patient's chart, MAR and AVS were reviewed by Randy Patterson, PHARMD.

## 2020-01-30 NOTE — PROGRESS NOTES
Problem: Falls - Risk of  Goal: *Absence of Falls  Description  Document Marco Antonio Madrigal Fall Risk and appropriate interventions in the flowsheet. Outcome: Progressing Towards Goal  Note: Fall Risk Interventions:  Mobility Interventions: Assess mobility with egress test    Mentation Interventions: Adequate sleep, hydration, pain control    Medication Interventions: Teach patient to arise slowly    Elimination Interventions: Toilet paper/wipes in reach    History of Falls Interventions: Door open when patient unattended    Resting in bed with eyes closed, no complaints, no distress noted. Safety measures in place, will continue to monitor.
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
## 2020-01-30 NOTE — BH NOTES
GROUP THERAPY PROGRESS NOTE    Fareed Marroquin is participating in [place].      Group time: 15 minutes    Personal goal for participation: discharge today and clean apartment when he get home    Goal orientation: community    Group therapy participation: active    Therapeutic interventions reviewed and discussed: yes    Impression of participation: engaged

## 2020-01-30 NOTE — INTERDISCIPLINARY ROUNDS
Behavioral Health Interdisciplinary Rounds     Patient Name: Mellie Dakins  Age: 36 y.o. Room/Bed:  730/  Primary Diagnosis: <principal problem not specified>   Admission Status: Voluntary Commitment     Readmission within 30 days: no  Power of  in place: no  Patient requires a blocked bed: no          Reason for blocked bed:     VTE Prophylaxis: No    Mobility needs/Fall risk: no  Flu Vaccine : no   Nutritional Plan: no  Consults:          Labs/Testing due today?: no    Sleep hours:  8      Participation in Care/Groups:  yes  Medication Compliant?: Yes  PRNS (last 24 hours): Antianxiety    Restraints (last 24 hours):  no     CIWA (range last 24 hours):     COWS (range last 24 hours):      Alcohol screening (AUDIT) completed -   AUDIT Score: 2     If applicable, date SBIRT discussed in treatment team AND documented:   AUDIT Screen Score: AUDIT Score: 2      Document Brief Intervention (corresponds directly with the 5 A's, Ask, Advise, Assess, Assist, and Arrange): At- Risk Patients (Score 7-15 for women; 8-15 for men)  Discuss concern patient is drinking at unhealthy levels known to increase risk of alcohol-related health problems. Is Patient ready to commit to change? If No:   Encourage reflection   Discuss short term and long term health risks of consuming alcohol   Barriers to change   Reaffirm willingness to help / Educational materials provided  If Yes:   Set goal  Mark media provided    Harmful use or Dependence (Score 16 or greater)   Discuss short term and long term health risks of consuming alcohol   Recommendations   Negotiate drinking goal   Recommend addiction specialist/center   Arrange follow-up appointments.     Tobacco - patient is a smoker: Have You Used Tobacco in the Past 30 Days: Yes  Illegal Drugs use: Have You Used Any Illegal Substances Over the Past 12 Months: Yes    24 hour chart check complete: yes     Patient goal(s) for today: Treatment team focus/goals:   Progress note     LOS:  5  Expected LOS:     Financial concerns/prescription coverage:    Family contact:     Family requesting physician contact today:   Discharge plan:   Access to weapons :       Outpatient provider(s):   Patient's preferred phone number for follow up call :     Participating treatment team members: Lizz Lozano, * (assigned SW),

## 2020-01-30 NOTE — PROGRESS NOTES
DISCHARGE SUMMARY from Nurse    PATIENT INSTRUCTIONS:      What to do at Home:  Recommended activity: Activity as tolerated,     *  Please give a list of your current medications to your Primary Care Provider. *  Please update this list whenever your medications are discontinued, doses are      changed, or new medications (including over-the-counter products) are added. *  Please carry medication information at all times in case of emergency situations. These are general instructions for a healthy lifestyle:    No smoking/ No tobacco products/ Avoid exposure to second hand smoke  Surgeon General's Warning:  Quitting smoking now greatly reduces serious risk to your health. Obesity, smoking, and sedentary lifestyle greatly increases your risk for illness    A healthy diet, regular physical exercise & weight monitoring are important for maintaining a healthy lifestyle    You may be retaining fluid if you have a history of heart failure or if you experience any of the following symptoms:  Weight gain of 3 pounds or more overnight or 5 pounds in a week, increased swelling in our hands or feet or shortness of breath while lying flat in bed. Please call your doctor as soon as you notice any of these symptoms; do not wait until your next office visit. The discharge information has been reviewed with the patient. The patient verbalized understanding. Discharge medications reviewed with the patient and appropriate educational materials and side effects teaching were provided. Patient received all valuables and belongings plus two prescriptions. .Awaiting arrival of family for transport home.  ___________________________________________________________________________________________________________________________________  Problem: Altered Thought Process (Adult/Pediatric)  Goal: *STG: Remains safe in hospital  Outcome: Progressing Towards Goal  Note:   Pt denies any suicidal or homicidal thoughts. Contracts for safety. Remains on Q 15 min safety checks. No agitation or aggression displayed       Problem: Altered Thought Process (Adult/Pediatric)  Goal: *LTG: Returns to baseline functioning  Outcome: Progressing Towards Goal  Note:   Self reports 'I feel like I  have things more together. I feel calmer. \"     Problem: Anxiety-Behavioral Health (Adult/Pediatric)  Goal: *STG: Attends activities and groups  Outcome: Not Progressing Towards Goal  Note:   Tends to decline group participation with no explanation offered.
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
## 2020-05-07 ENCOUNTER — ED HISTORICAL/CONVERTED ENCOUNTER (OUTPATIENT)
Dept: OTHER | Age: 35
End: 2020-05-07

## 2020-06-05 ENCOUNTER — ED HISTORICAL/CONVERTED ENCOUNTER (OUTPATIENT)
Dept: OTHER | Age: 35
End: 2020-06-05

## 2020-08-16 ENCOUNTER — IP HISTORICAL/CONVERTED ENCOUNTER (OUTPATIENT)
Dept: OTHER | Age: 35
End: 2020-08-16

## 2020-09-02 ENCOUNTER — ED HISTORICAL/CONVERTED ENCOUNTER (OUTPATIENT)
Dept: OTHER | Age: 35
End: 2020-09-02

## 2021-05-25 ENCOUNTER — TRANSCRIBE ORDER (OUTPATIENT)
Dept: SCHEDULING | Age: 36
End: 2021-05-25

## 2021-05-25 DIAGNOSIS — R93.89 ABNORMAL RADIOLOGICAL FINDINGS IN SKIN AND SUBCUTANEOUS TISSUE: Primary | ICD-10-CM

## 2021-06-07 ENCOUNTER — HOSPITAL ENCOUNTER (OUTPATIENT)
Dept: MRI IMAGING | Age: 36
Discharge: HOME | End: 2021-06-07
Attending: NURSE PRACTITIONER
Payer: MEDICAID

## 2021-06-07 VITALS — WEIGHT: 145 LBS | BODY MASS INDEX: 20.81 KG/M2

## 2021-06-07 DIAGNOSIS — R93.89 ABNORMAL RADIOLOGICAL FINDINGS IN SKIN AND SUBCUTANEOUS TISSUE: ICD-10-CM

## 2021-06-07 PROCEDURE — A9585 GADOBUTROL INJECTION: HCPCS | Performed by: RADIOLOGY

## 2021-06-07 PROCEDURE — 74183 MRI ABD W/O CNTR FLWD CNTR: CPT

## 2021-06-07 PROCEDURE — 74011250636 HC RX REV CODE- 250/636: Performed by: RADIOLOGY

## 2021-06-07 PROCEDURE — 77030021566

## 2021-06-07 RX ADMIN — GADOBUTROL 6 ML: 604.72 INJECTION INTRAVENOUS at 09:46

## 2022-03-20 PROBLEM — F25.9 SCHIZOAFFECTIVE DISORDER (HCC): Status: ACTIVE | Noted: 2020-01-25

## 2023-05-14 RX ORDER — BUPROPION HYDROCHLORIDE 150 MG/1
150 TABLET ORAL
COMMUNITY

## 2023-05-14 RX ORDER — QUETIAPINE FUMARATE 100 MG/1
100 TABLET, FILM COATED ORAL DAILY
COMMUNITY
Start: 2020-01-30

## 2023-05-14 RX ORDER — QUETIAPINE FUMARATE 300 MG/1
300 TABLET, FILM COATED ORAL
COMMUNITY
Start: 2020-01-30

## 2025-01-31 ENCOUNTER — HOSPITAL ENCOUNTER (EMERGENCY)
Facility: HOSPITAL | Age: 40
Discharge: HOME | End: 2025-01-31
Attending: EMERGENCY MEDICINE
Payer: OTHER GOVERNMENT

## 2025-01-31 VITALS
TEMPERATURE: 99.9 F | OXYGEN SATURATION: 100 % | BODY MASS INDEX: 22.19 KG/M2 | WEIGHT: 155 LBS | DIASTOLIC BLOOD PRESSURE: 66 MMHG | HEART RATE: 88 BPM | SYSTOLIC BLOOD PRESSURE: 135 MMHG | HEIGHT: 70 IN | RESPIRATION RATE: 18 BRPM

## 2025-01-31 DIAGNOSIS — J10.1 INFLUENZA A: Primary | ICD-10-CM

## 2025-01-31 DIAGNOSIS — E86.0 DEHYDRATION: ICD-10-CM

## 2025-01-31 DIAGNOSIS — R11.2 NAUSEA AND VOMITING, UNSPECIFIED VOMITING TYPE: ICD-10-CM

## 2025-01-31 LAB
ALBUMIN SERPL-MCNC: 4.5 G/DL (ref 3.5–5)
ALBUMIN/GLOB SERPL: 1.1 (ref 1.1–2.2)
ALP SERPL-CCNC: 65 U/L (ref 45–117)
ALT SERPL-CCNC: 49 U/L (ref 12–78)
ANION GAP SERPL CALC-SCNC: 14 MMOL/L (ref 2–12)
AST SERPL W P-5'-P-CCNC: 21 U/L (ref 15–37)
BASOPHILS # BLD: 0.02 K/UL (ref 0–0.1)
BASOPHILS NFR BLD: 0.2 % (ref 0–1)
BILIRUB DIRECT SERPL-MCNC: 0.1 MG/DL (ref 0–0.2)
BILIRUB SERPL-MCNC: 0.5 MG/DL (ref 0.2–1)
BUN SERPL-MCNC: 16 MG/DL (ref 6–20)
BUN/CREAT SERPL: 13 (ref 12–20)
CA-I BLD-MCNC: 9.8 MG/DL (ref 8.5–10.1)
CHLORIDE SERPL-SCNC: 99 MMOL/L (ref 97–108)
CO2 SERPL-SCNC: 25 MMOL/L (ref 21–32)
CREAT SERPL-MCNC: 1.24 MG/DL (ref 0.7–1.3)
DIFFERENTIAL METHOD BLD: ABNORMAL
EOSINOPHIL # BLD: 0.04 K/UL (ref 0–0.4)
EOSINOPHIL NFR BLD: 0.3 % (ref 0–7)
ERYTHROCYTE [DISTWIDTH] IN BLOOD BY AUTOMATED COUNT: 13.2 % (ref 11.5–14.5)
FLUAV RNA SPEC QL NAA+PROBE: DETECTED
FLUBV RNA SPEC QL NAA+PROBE: NOT DETECTED
GLOBULIN SER CALC-MCNC: 4.1 G/DL (ref 2–4)
GLUCOSE SERPL-MCNC: 115 MG/DL (ref 65–100)
HCT VFR BLD AUTO: 45.3 % (ref 36.6–50.3)
HGB BLD-MCNC: 14.6 G/DL (ref 12.1–17)
IMM GRANULOCYTES # BLD AUTO: 0.02 K/UL (ref 0–0.04)
IMM GRANULOCYTES NFR BLD AUTO: 0.2 % (ref 0–0.5)
LYMPHOCYTES # BLD: 1 K/UL (ref 0.8–3.5)
LYMPHOCYTES NFR BLD: 8.2 % (ref 12–49)
MCH RBC QN AUTO: 28.2 PG (ref 26–34)
MCHC RBC AUTO-ENTMCNC: 32.2 G/DL (ref 30–36.5)
MCV RBC AUTO: 87.5 FL (ref 80–99)
MONOCYTES # BLD: 0.64 K/UL (ref 0–1)
MONOCYTES NFR BLD: 5.3 % (ref 5–13)
NEUTS SEG # BLD: 10.45 K/UL (ref 1.8–8)
NEUTS SEG NFR BLD: 85.8 % (ref 32–75)
PLATELET # BLD AUTO: 219 K/UL (ref 150–400)
PMV BLD AUTO: 9.8 FL (ref 8.9–12.9)
POTASSIUM SERPL-SCNC: 4 MMOL/L (ref 3.5–5.1)
PROT SERPL-MCNC: 8.6 G/DL (ref 6.4–8.2)
RBC # BLD AUTO: 5.18 M/UL (ref 4.1–5.7)
SARS-COV-2 RNA RESP QL NAA+PROBE: NOT DETECTED
SODIUM SERPL-SCNC: 138 MMOL/L (ref 136–145)
WBC # BLD AUTO: 12.2 K/UL (ref 4.1–11.1)

## 2025-01-31 PROCEDURE — 96374 THER/PROPH/DIAG INJ IV PUSH: CPT

## 2025-01-31 PROCEDURE — 96375 TX/PRO/DX INJ NEW DRUG ADDON: CPT

## 2025-01-31 PROCEDURE — 6370000000 HC RX 637 (ALT 250 FOR IP): Performed by: EMERGENCY MEDICINE

## 2025-01-31 PROCEDURE — 80048 BASIC METABOLIC PNL TOTAL CA: CPT

## 2025-01-31 PROCEDURE — 99284 EMERGENCY DEPT VISIT MOD MDM: CPT

## 2025-01-31 PROCEDURE — 80076 HEPATIC FUNCTION PANEL: CPT

## 2025-01-31 PROCEDURE — 2580000003 HC RX 258: Performed by: EMERGENCY MEDICINE

## 2025-01-31 PROCEDURE — 36415 COLL VENOUS BLD VENIPUNCTURE: CPT

## 2025-01-31 PROCEDURE — 96361 HYDRATE IV INFUSION ADD-ON: CPT

## 2025-01-31 PROCEDURE — 87636 SARSCOV2 & INF A&B AMP PRB: CPT

## 2025-01-31 PROCEDURE — 6360000002 HC RX W HCPCS: Performed by: EMERGENCY MEDICINE

## 2025-01-31 PROCEDURE — 85025 COMPLETE CBC W/AUTO DIFF WBC: CPT

## 2025-01-31 RX ORDER — KETOROLAC TROMETHAMINE 15 MG/ML
15 INJECTION, SOLUTION INTRAMUSCULAR; INTRAVENOUS ONCE
Status: COMPLETED | OUTPATIENT
Start: 2025-01-31 | End: 2025-01-31

## 2025-01-31 RX ORDER — 0.9 % SODIUM CHLORIDE 0.9 %
1000 INTRAVENOUS SOLUTION INTRAVENOUS
Status: COMPLETED | OUTPATIENT
Start: 2025-01-31 | End: 2025-01-31

## 2025-01-31 RX ORDER — ONDANSETRON 4 MG/1
4 TABLET, ORALLY DISINTEGRATING ORAL 3 TIMES DAILY PRN
Qty: 21 TABLET | Refills: 0 | Status: SHIPPED | OUTPATIENT
Start: 2025-01-31

## 2025-01-31 RX ORDER — ACETAMINOPHEN 325 MG/1
650 TABLET ORAL
Status: COMPLETED | OUTPATIENT
Start: 2025-01-31 | End: 2025-01-31

## 2025-01-31 RX ORDER — ONDANSETRON 2 MG/ML
8 INJECTION INTRAMUSCULAR; INTRAVENOUS ONCE
Status: COMPLETED | OUTPATIENT
Start: 2025-01-31 | End: 2025-01-31

## 2025-01-31 RX ADMIN — ONDANSETRON 8 MG: 2 INJECTION INTRAMUSCULAR; INTRAVENOUS at 09:22

## 2025-01-31 RX ADMIN — ACETAMINOPHEN 650 MG: 325 TABLET ORAL at 09:22

## 2025-01-31 RX ADMIN — KETOROLAC TROMETHAMINE 15 MG: 15 INJECTION, SOLUTION INTRAMUSCULAR; INTRAVENOUS at 09:23

## 2025-01-31 RX ADMIN — SODIUM CHLORIDE 1000 ML: 9 INJECTION, SOLUTION INTRAVENOUS at 09:29

## 2025-01-31 ASSESSMENT — PAIN - FUNCTIONAL ASSESSMENT: PAIN_FUNCTIONAL_ASSESSMENT: 0-10

## 2025-01-31 ASSESSMENT — PAIN DESCRIPTION - LOCATION: LOCATION: HEAD

## 2025-01-31 ASSESSMENT — PAIN DESCRIPTION - DESCRIPTORS: DESCRIPTORS: POUNDING

## 2025-01-31 ASSESSMENT — PAIN SCALES - GENERAL: PAINLEVEL_OUTOF10: 8

## 2025-01-31 NOTE — ED PROVIDER NOTES
[facility] EMERGENCY DEPT  EMERGENCY DEPARTMENT HISTORY AND PHYSICAL EXAM      Date: 1/31/2025  Patient Name: Lew March Jr.  MRN: [id number]  Birthdate 1985  Date of evaluation: 1/31/2025  Provider: Frank Caicedo MD   Note Started: 8:59 AM EST 1/31/25    HISTORY OF PRESENT ILLNESS     Chief Complaint   Patient presents with    Back Pain    Headache    Nausea       History Provided By: Patient    HPI: Lew March Jr. is a 39 y.o. male presents with nausea bodyaches fatigue headache 1 day    PAST MEDICAL HISTORY   Past Medical History:  History reviewed. No pertinent past medical history.    Past Surgical History:  History reviewed. No pertinent surgical history.    Family History:  History reviewed. No pertinent family history.    Social History:  Social History     Tobacco Use    Smoking status: Every Day     Current packs/day: 1.00     Types: Cigarettes    Smokeless tobacco: Never   Substance Use Topics    Alcohol use: Not Currently    Drug use: Never       Allergies:  Allergies   Allergen Reactions    Sulfamethoxazole-Trimethoprim      Other reaction(s): Unknown (comments)       PCP: Jesse Arauz MD    Current Meds:   Current Facility-Administered Medications   Medication Dose Route Frequency Provider Last Rate Last Admin    sodium chloride 0.9 % bolus 1,000 mL  1,000 mL IntraVENous NOW Frank Caicedo .6 mL/hr at 01/31/25 0929 1,000 mL at 01/31/25 0929     Current Outpatient Medications   Medication Sig Dispense Refill    buPROPion (WELLBUTRIN XL) 150 MG extended release tablet Take 1 tablet by mouth      QUEtiapine (SEROQUEL) 100 MG tablet Take 1 tablet by mouth daily      QUEtiapine (SEROQUEL) 300 MG tablet Take 1 tablet by mouth         Social Determinants of Health:   Social Determinants of Health     Tobacco Use: High Risk (1/31/2025)    Patient History     Smoking Tobacco Use: Every Day     Smokeless Tobacco Use: Never     Passive Exposure: Not on file   Alcohol Use: Not on

## 2025-06-29 ENCOUNTER — HOSPITAL ENCOUNTER (EMERGENCY)
Facility: HOSPITAL | Age: 40
Discharge: HOME | End: 2025-06-29
Attending: EMERGENCY MEDICINE
Payer: OTHER GOVERNMENT

## 2025-06-29 ENCOUNTER — APPOINTMENT (OUTPATIENT)
Facility: HOSPITAL | Age: 40
End: 2025-06-29
Payer: OTHER GOVERNMENT

## 2025-06-29 VITALS
WEIGHT: 155 LBS | OXYGEN SATURATION: 98 % | RESPIRATION RATE: 18 BRPM | BODY MASS INDEX: 22.19 KG/M2 | HEART RATE: 91 BPM | SYSTOLIC BLOOD PRESSURE: 142 MMHG | DIASTOLIC BLOOD PRESSURE: 81 MMHG | TEMPERATURE: 98.5 F | HEIGHT: 70 IN

## 2025-06-29 DIAGNOSIS — R07.9 CHEST PAIN, UNSPECIFIED TYPE: Primary | ICD-10-CM

## 2025-06-29 DIAGNOSIS — R11.2 NAUSEA AND VOMITING, UNSPECIFIED VOMITING TYPE: ICD-10-CM

## 2025-06-29 DIAGNOSIS — F25.9 SCHIZOAFFECTIVE DISORDER, UNSPECIFIED TYPE (HCC): ICD-10-CM

## 2025-06-29 DIAGNOSIS — F19.10 POLYSUBSTANCE ABUSE (HCC): ICD-10-CM

## 2025-06-29 LAB
ALBUMIN SERPL-MCNC: 3.7 G/DL (ref 3.5–5)
ALBUMIN/GLOB SERPL: 1 (ref 1.1–2.2)
ALP SERPL-CCNC: 45 U/L (ref 45–117)
ALT SERPL-CCNC: 42 U/L (ref 12–78)
AMPHET UR QL SCN: POSITIVE
ANION GAP SERPL CALC-SCNC: 10 MMOL/L (ref 2–12)
APPEARANCE UR: CLEAR
AST SERPL W P-5'-P-CCNC: 25 U/L (ref 15–37)
BACTERIA URNS QL MICRO: NEGATIVE /HPF
BARBITURATES UR QL SCN: NEGATIVE
BASOPHILS # BLD: 0.02 K/UL (ref 0–0.1)
BASOPHILS NFR BLD: 0.2 % (ref 0–1)
BENZODIAZ UR QL: NEGATIVE
BILIRUB SERPL-MCNC: 0.5 MG/DL (ref 0.2–1)
BILIRUB UR QL: NEGATIVE
BUN SERPL-MCNC: 16 MG/DL (ref 6–20)
BUN/CREAT SERPL: 20 (ref 12–20)
BUPRENORPHINE UR QL: NEGATIVE
CA-I BLD-MCNC: 9.4 MG/DL (ref 8.5–10.1)
CANNABINOIDS UR QL SCN: POSITIVE
CHLORIDE SERPL-SCNC: 102 MMOL/L (ref 97–108)
CO2 SERPL-SCNC: 27 MMOL/L (ref 21–32)
COCAINE UR QL SCN: NEGATIVE
COLOR UR: YELLOW
CREAT SERPL-MCNC: 0.82 MG/DL (ref 0.7–1.3)
DIFFERENTIAL METHOD BLD: ABNORMAL
EOSINOPHIL # BLD: 0.19 K/UL (ref 0–0.4)
EOSINOPHIL NFR BLD: 2 % (ref 0–7)
EPITH CASTS URNS QL MICRO: ABNORMAL /LPF
ERYTHROCYTE [DISTWIDTH] IN BLOOD BY AUTOMATED COUNT: 12.6 % (ref 11.5–14.5)
GLOBULIN SER CALC-MCNC: 3.6 G/DL (ref 2–4)
GLUCOSE SERPL-MCNC: 105 MG/DL (ref 65–100)
GLUCOSE UR STRIP.AUTO-MCNC: NEGATIVE MG/DL
HCT VFR BLD AUTO: 43.8 % (ref 36.6–50.3)
HGB BLD-MCNC: 14.1 G/DL (ref 12.1–17)
HGB UR QL STRIP: NEGATIVE
IMM GRANULOCYTES # BLD AUTO: 0.01 K/UL (ref 0–0.04)
IMM GRANULOCYTES NFR BLD AUTO: 0.1 % (ref 0–0.5)
KETONES UR QL STRIP.AUTO: ABNORMAL MG/DL
LEUKOCYTE ESTERASE UR QL STRIP.AUTO: NEGATIVE
LYMPHOCYTES # BLD: 3.22 K/UL (ref 0.8–3.5)
LYMPHOCYTES NFR BLD: 34.3 % (ref 12–49)
Lab: ABNORMAL
MCH RBC QN AUTO: 28.5 PG (ref 26–34)
MCHC RBC AUTO-ENTMCNC: 32.2 G/DL (ref 30–36.5)
MCV RBC AUTO: 88.7 FL (ref 80–99)
METHADONE UR QL: POSITIVE
METHAMPHET UR QL: POSITIVE
MONOCYTES # BLD: 0.45 K/UL (ref 0–1)
MONOCYTES NFR BLD: 4.8 % (ref 5–13)
MUCOUS THREADS URNS QL MICRO: ABNORMAL /LPF
NEUTS SEG # BLD: 5.49 K/UL (ref 1.8–8)
NEUTS SEG NFR BLD: 58.6 % (ref 32–75)
NITRITE UR QL STRIP.AUTO: NEGATIVE
OPIATES UR QL: NEGATIVE
OXYCODONE UR QL SCN: NEGATIVE
PCP UR QL: NEGATIVE
PH UR STRIP: 6 (ref 5–8)
PLATELET # BLD AUTO: 225 K/UL (ref 150–400)
PMV BLD AUTO: 9.4 FL (ref 8.9–12.9)
POTASSIUM SERPL-SCNC: 3.7 MMOL/L (ref 3.5–5.1)
PROPOXYPH UR QL: NEGATIVE
PROT SERPL-MCNC: 7.3 G/DL (ref 6.4–8.2)
PROT UR STRIP-MCNC: ABNORMAL MG/DL
RBC # BLD AUTO: 4.94 M/UL (ref 4.1–5.7)
RBC #/AREA URNS HPF: ABNORMAL /HPF (ref 0–5)
SODIUM SERPL-SCNC: 139 MMOL/L (ref 136–145)
SP GR UR REFRACTOMETRY: 1.02 (ref 1–1.03)
TRICYCLICS UR QL: NEGATIVE
TROPONIN I SERPL HS-MCNC: 4 NG/L (ref 0–76)
TROPONIN I SERPL HS-MCNC: 5 NG/L (ref 0–76)
TSH SERPL DL<=0.05 MIU/L-ACNC: 0.51 UIU/ML (ref 0.36–3.74)
URINE CULTURE IF INDICATED: ABNORMAL
UROBILINOGEN UR QL STRIP.AUTO: NORMAL EU/DL (ref 0.2–1)
WBC # BLD AUTO: 9.4 K/UL (ref 4.1–11.1)
WBC URNS QL MICRO: ABNORMAL /HPF (ref 0–4)

## 2025-06-29 PROCEDURE — 80053 COMPREHEN METABOLIC PANEL: CPT

## 2025-06-29 PROCEDURE — 81001 URINALYSIS AUTO W/SCOPE: CPT

## 2025-06-29 PROCEDURE — 84443 ASSAY THYROID STIM HORMONE: CPT

## 2025-06-29 PROCEDURE — 36415 COLL VENOUS BLD VENIPUNCTURE: CPT

## 2025-06-29 PROCEDURE — 93005 ELECTROCARDIOGRAM TRACING: CPT | Performed by: EMERGENCY MEDICINE

## 2025-06-29 PROCEDURE — 71045 X-RAY EXAM CHEST 1 VIEW: CPT

## 2025-06-29 PROCEDURE — 84484 ASSAY OF TROPONIN QUANT: CPT

## 2025-06-29 PROCEDURE — 99285 EMERGENCY DEPT VISIT HI MDM: CPT

## 2025-06-29 PROCEDURE — 85025 COMPLETE CBC W/AUTO DIFF WBC: CPT

## 2025-06-29 PROCEDURE — 80307 DRUG TEST PRSMV CHEM ANLYZR: CPT

## 2025-06-29 ASSESSMENT — PAIN DESCRIPTION - LOCATION: LOCATION: CHEST

## 2025-06-29 ASSESSMENT — PAIN SCALES - GENERAL: PAINLEVEL_OUTOF10: 7
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
## 2025-06-29 NOTE — ED TRIAGE NOTES
Vomiting, nausea for about 2 days, also weakenss.     Chest pain for months now with tingling and numbness in fingers    Went to [facility] the other night but they did not test and was sent home

## 2025-06-29 NOTE — DISCHARGE INSTRUCTIONS
Thank you for choosing our Emergency Department for your care.  It is our privilege to care for you in your time of need.  In the next several days, you may receive a survey via email or mailed to your home about your experience with our team.  We would greatly appreciate you taking a few minutes to complete the survey, as we use this information to learn what we have done well and what we could be doing better. Thank you for trusting us with your care!    Below you will find a list of your tests from today's visit.   Labs and Radiology Studies  Recent Results (from the past 12 hours)   CBC with Auto Differential    Collection Time: 06/29/25  6:10 PM   Result Value Ref Range    WBC 9.4 4.1 - 11.1 K/uL    RBC 4.94 4.10 - 5.70 M/uL    Hemoglobin 14.1 12.1 - 17.0 g/dL    Hematocrit 43.8 36.6 - 50.3 %    MCV 88.7 80.0 - 99.0 FL    MCH 28.5 26.0 - 34.0 PG    MCHC 32.2 30.0 - 36.5 g/dL    RDW 12.6 11.5 - 14.5 %    Platelets 225 150 - 400 K/uL    MPV 9.4 8.9 - 12.9 FL    Neutrophils % 58.6 32.0 - 75.0 %    Lymphocytes % 34.3 12.0 - 49.0 %    Monocytes % 4.8 (L) 5.0 - 13.0 %    Eosinophils % 2.0 0.0 - 7.0 %    Basophils % 0.2 0.0 - 1.0 %    Immature Granulocytes % 0.1 0.0 - 0.5 %    Neutrophils Absolute 5.49 1.80 - 8.00 K/UL    Lymphocytes Absolute 3.22 0.80 - 3.50 K/UL    Monocytes Absolute 0.45 0.00 - 1.00 K/UL    Eosinophils Absolute 0.19 0.00 - 0.40 K/UL    Basophils Absolute 0.02 0.00 - 0.10 K/UL    Immature Granulocytes Absolute 0.01 0.00 - 0.04 K/UL    Differential Type AUTOMATED     Comprehensive Metabolic Panel    Collection Time: 06/29/25  6:10 PM   Result Value Ref Range    Sodium 139 136 - 145 mmol/L    Potassium 3.7 3.5 - 5.1 mmol/L    Chloride 102 97 - 108 mmol/L    CO2 27 21 - 32 mmol/L    Anion Gap 10 2 - 12 mmol/L    Glucose 105 (H) 65 - 100 mg/dL    BUN 16 6 - 20 mg/dL    Creatinine 0.82 0.70 - 1.30 mg/dL    BUN/Creatinine Ratio 20 12 - 20      Est, Glom Filt Rate >90 >60 ml/min/1.73m2    Calcium 9.4

## 2025-06-30 LAB
EKG ATRIAL RATE: 110 BPM
EKG DIAGNOSIS: NORMAL
EKG P AXIS: 42 DEGREES
EKG P-R INTERVAL: 124 MS
EKG Q-T INTERVAL: 326 MS
EKG QRS DURATION: 80 MS
EKG QTC CALCULATION (BAZETT): 441 MS
EKG R AXIS: 66 DEGREES
EKG T AXIS: 54 DEGREES
EKG VENTRICULAR RATE: 110 BPM

## 2025-07-06 ASSESSMENT — HEART SCORE: ECG: NORMAL
